# Patient Record
Sex: FEMALE | Race: WHITE | NOT HISPANIC OR LATINO | URBAN - METROPOLITAN AREA
[De-identification: names, ages, dates, MRNs, and addresses within clinical notes are randomized per-mention and may not be internally consistent; named-entity substitution may affect disease eponyms.]

---

## 2024-02-08 ENCOUNTER — CLINICAL SUPPORT (OUTPATIENT)
Dept: AUDIOLOGY | Facility: CLINIC | Age: 64
End: 2024-02-08
Payer: COMMERCIAL

## 2024-02-08 DIAGNOSIS — H90.42 SENSORINEURAL HEARING LOSS (SNHL) OF LEFT EAR WITH UNRESTRICTED HEARING OF RIGHT EAR: Primary | ICD-10-CM

## 2024-02-08 NOTE — PROGRESS NOTES
HEARING NEEDS ASSESSMENT    Name:  Amanda Fiore  :  1960  Age:  63 y.o.    HISTORY:    Patient reported to be a long-time user of CROS style hearing aids as she was diagnosed with hearing loss (left worse than right) in first or second grade, with no known etiology.  Patient provided a copy of her most recent audiogram in office today.  She worked at [employer] around 13 years ago where she purchased Phonak CROS devices.  Since then, she does not work at [employer] anymore and purchase Noreen CROS devices from a different clinic.  She is interested in new CROS devices.    EXAM/PROCEDURES:    Reviewed patient's hearing loss.  Reviewed patient's insurance coverage.     Demonstrated both Oticon CROS technology with patient in office during the appointment.  CROS device in office is battery-operated and hearing aid side is rechargeable.    Discussed patient's communication needs and concerns.  Discussed patient's expectations and goals for use of hearing aids.      Discussed rechargeable considerations, Bluetooth technologies, and features of the hearing aids at different technology levels.  Patient expressed interest in either the basic or advanced technology level.  Reviewed pricing and TLC program.    Provided patient with demo devices to trial at home.  Patient will contact the office or make an appointment when she has made a decision.  She is leaning towards Oticon technology at this time.    RECOMMENDATIONS AND FOLLOW-UP:    Patient is to contact the office when she makes a decision about Oticon CROS device technology level.  She was advised that if she wants to let the office know sooner, the hearing aids will be ordered before she returns for her appointment.      ROSE MARY Patterson./B.S.  Audiology Student Extern    Ronda Wright  Clinical Audiologist

## 2024-03-14 ENCOUNTER — CLINICAL SUPPORT (OUTPATIENT)
Dept: AUDIOLOGY | Facility: CLINIC | Age: 64
End: 2024-03-14
Payer: COMMERCIAL

## 2024-03-14 DIAGNOSIS — H90.42 SENSORINEURAL HEARING LOSS (SNHL) OF LEFT EAR WITH UNRESTRICTED HEARING OF RIGHT EAR: Primary | ICD-10-CM

## 2024-03-14 NOTE — PROGRESS NOTES
HEARING NEEDS ASSESSMENT    Name:  Amanda Fiore  :  1960  Age:  63 y.o.    HISTORY:    Patient was seen for a follow-up hearing needs assessment after she demoed Oticon CROS device.    EXAM/PROCEDURES:    Patient was pleased with the battery life of both the rechargeable device as well as the disposable battery-powered CROS device.  She did well with the hearing aids and is ready to proceed with purchase.  Reviewed price points and technology features of the hearing aids.  Also reviewed the TLC program.    Patient chose Oticon real 3 with CROS in steel gray.  Devices were ordered through my [employer].    RECOMMENDATIONS AND FOLLOW-UP:    Hearing aid fitting scheduled.  Patient to contact the office with any questions or concerns as they arise.  Patient will continue to utilize demo devices until she returns for her scheduled fitting.    Patient is aware that payment is due at the time of fitting.    ROSE MARY Patterson./B.S.  Audiology Student Extern    Ronda Wright  Clinical Audiologist

## 2024-03-19 ENCOUNTER — CLINICAL SUPPORT (OUTPATIENT)
Dept: AUDIOLOGY | Facility: CLINIC | Age: 64
End: 2024-03-19
Payer: COMMERCIAL

## 2024-03-19 DIAGNOSIS — H90.42 SENSORINEURAL HEARING LOSS (SNHL) OF LEFT EAR WITH UNRESTRICTED HEARING OF RIGHT EAR: Primary | ICD-10-CM

## 2024-03-19 NOTE — PROGRESS NOTES
HEARING AID FITTING    Name:  Amanda Fiore  :  1960  Age:  63 y.o.    HEARING AID INFORMATION:    Right Hearing Aid  Oticon Real 3  SN: [id number]  Warranty: 2027  Left Hearing Aid  Oticon CROS PX  SN: [id number]  Warranty: 2027    SN: [id number]   Length   Right: #1, 60 gain  Left: #1, 60 gain  Dome/Earmold  Right: 6 mm open bass  Left: 6 mm open bass  Wax Filter  minifit prowax  Battery Size  rechargeable    TLC Expiration:  2026    EXAM/PROCEDURES:    Patient seen for hearing aid fitting. Changed fitting from CROS fitting to a BiCROS fitting due to patients preferences, as she wanted more volume from the CROS transmitter.    Hearing aids programmed to most recent user audiogram.  Set to adaptation level 2.   Activated automatic adaptation.  Programmed prescription to build to adaptation level 3 over a 3 week period of time. Reviewed alerting tones.  Volume control active.    Reviewed maintenance and care, including wax filter replacement.  Practiced insertion, removal, charging, and VC use.  Patient demonstrated adequate manipulation of the devices.    Completed hearing aid fitting checklist, TLC agreement, and purchase agreement.    Patient chose to not pair hearing aids to her phone at this time. Although she is interested in blue tooth connection for music and audio books, it was discussed that this will be brought up at her follow up appointment.    RECOMMENDATIONS AND FOLLOW-UP:    Recommended and scheduled a follow-up visit before patient leaves for vacation in mid-April. Patient is to contact the office sooner if problems arise.    ROSE MARY Patterson./B.S.  Audiology Student Extern    Ronda Wright  Clinical Audiologist

## 2024-03-21 ENCOUNTER — APPOINTMENT (OUTPATIENT)
Dept: AUDIOLOGY | Facility: CLINIC | Age: 64
End: 2024-03-21
Payer: COMMERCIAL

## 2024-04-11 ENCOUNTER — CLINICAL SUPPORT (OUTPATIENT)
Dept: AUDIOLOGY | Facility: CLINIC | Age: 64
End: 2024-04-11
Payer: COMMERCIAL

## 2024-04-11 DIAGNOSIS — H90.42 SENSORINEURAL HEARING LOSS (SNHL) OF LEFT EAR WITH UNRESTRICTED HEARING OF RIGHT EAR: Primary | ICD-10-CM

## 2024-04-11 NOTE — PROGRESS NOTES
HEARING AID CHECK    Name:  Amanda Fiore  :  1960  Age:  63 y.o.    HEARING AID INFORMATION:    Right Hearing Aid  Oticon Real 3  SN: [id number]  Warranty: 2027  Left Hearing Aid  Oticon CROS PX  SN: [id number]  Warranty: 2027    SN: [id number]   Length   Right: #1, 60 gain  Left: #1, 60 gain  Dome/Earmold  Right: 6 mm open bass  Left: 6 mm open bass  Wax Filter  minifit prowax  Battery Size  rechargeable     TLC Expiration:  2026      HISTORY:    Patient seen for follow up after fitting. Today, she had questions about the volume control on the CROS side as well as wind noise reduction settings. In addition, she stated that starting this week the CROS side (left hearing aid) indicator light is delayed when opening up the . Today, the indicator light did not come on in the morning, but when she put it in it was fully charged. Otherwise, she is satisfied with the sound quality and function of the hearing aids.    EXAM/PROCEDURES:    Verified that wind reduction was turned on and turned sudden sound stabilizer up to high. Discussed and reviewed how to manually change volume on both the CROS side and the actual hearing aid.    Patient stated that the charging abilities do not bother her at this time, but it was advised to monitor the indicator light and if there are problems with charging, especially on the left side to contact the office.    RECOMMENDATIONS AND FOLLOW-UP:    Recommended and scheduled 1-month follow-up HAC.  Patient to contact the office sooner if problems arise.    ROSE MARY Patterson./B.S.  Audiology Student Extern    Ronda Wright  Clinical Audiologist

## 2024-05-16 ENCOUNTER — CLINICAL SUPPORT (OUTPATIENT)
Dept: AUDIOLOGY | Facility: CLINIC | Age: 64
End: 2024-05-16
Payer: COMMERCIAL

## 2024-05-16 DIAGNOSIS — H90.42 SENSORINEURAL HEARING LOSS (SNHL) OF LEFT EAR WITH UNRESTRICTED HEARING OF RIGHT EAR: Primary | ICD-10-CM

## 2024-05-16 NOTE — PROGRESS NOTES
HEARING AID CHECK    Name:  Amanda Fiore  :  1960  Age:  63 y.o.    HEARING AID INFORMATION:    Right Hearing Aid  Oticon Real 3  SN: [id number]  Warranty: 2027  Left Hearing Aid  Oticon CROS PX  SN: [id number]  Warranty: 2027    SN: [id number]   Length   Right: #1, 60 gain  Left: #1, 60 gain  Dome/Earmold  Right: 6 mm open bass  Left: 6 mm open bass  Wax Filter  minifit prowax  Battery Size  rechargeable     TLC Expiration:  2026      HISTORY:    Patient seen for follow-up hearing aid check.  Overall she is doing well.  She still reported occasions where she was not getting enough input from the left side.    EXAM/PROCEDURES:    Reduced more sound intelligence features for noise reduction in order to improve clarity of speech.  Increased CROS input to 4 dB.    Carried on conversation with patient while restaurant noise was playing in the background.  She seemed to do well, but will monitor in her home environment.    RECOMMENDATIONS AND FOLLOW-UP:    Recommended and scheduled 6-month follow-up HAC.  Patient to contact the office sooner if problems arise.      Ronda Wright  Clinical Audiologist

## 2024-11-14 ENCOUNTER — APPOINTMENT (OUTPATIENT)
Dept: AUDIOLOGY | Facility: CLINIC | Age: 64
End: 2024-11-14
Payer: COMMERCIAL

## 2024-11-14 DIAGNOSIS — H90.42 SENSORINEURAL HEARING LOSS (SNHL) OF LEFT EAR WITH UNRESTRICTED HEARING OF RIGHT EAR: Primary | ICD-10-CM

## 2024-11-14 NOTE — PROGRESS NOTES
"HEARING AID CHECK    Name:  Amanda Fiore \"Amanda\"  :  1960  Age:  64 y.o.    HEARING AID INFORMATION:    Right Hearing Aid  Oticon Real 3 miniRITE-R  SN: [id number]  Warranty: 2027  Left Hearing Aid  Oticon CROS PX  SN: [id number]  Warranty: 2027    SN: [id number]   Length   Right: 1(60)  Left: 1(60)  Dome/Earmold  Right: 6 mm open bass  Left: 6 mm open bass  Wax Filter  ProWax miniFit  Battery Size  Rechargeable  TLC Expiration:  2026      HISTORY:    Patient was seen for 6-month check of the above devices.  She reported that she noticed the indicator light on the CROS side sometimes shuts off while charging.  Also, she unplugged her smart  to charge her hearing aids on the go while traveling, and the  was not holding a charge long enough to charge her hearing aids.    Lastly, patient brought in hearing aids that belonged to her uncle who recently passed away.  Incidentally, her uncle also happens to be a patient with this practice.  The hearing aids are relatively new, and happen to be a hearing aid and a CROS.  She inquired if this could be programmed to her hearing loss that she could use as a backup.    EXAM/PROCEDURES:    Cleaned and checked hearing aids.  Vacuumed nell ports and  ports. Completed Redux dehumidification treatment for patient's hearing aids where <0.5 uL of moisture was removed during a 2:53 minute cycle. Replaced wax filters and domes. Final listening check indicated adequate sound quality and function with no distortion of internal feedback.  Completed firmware update.    Visual inspection of the CROS revealed intermittent function of the indicator light.  Counseled patient that this may just be a malfunction with the light, and the only way to resolve the issue would be to send the place in for repair.  As for her smart , it should be holding a charge when unplug from a power source, but Oticon will be contacted " to determine if this is a known issue.    In regards to the other pair of hearing aids that she acquired from her uncle, informed patient that they would be able to be programmed for her as a backup.  It was recommended that she schedule an hour-long hearing aid check so that they can be cleaned, checked, programmed, and verified.    RECOMMENDATIONS AND FOLLOW-UP:    - Continue use of amplification and follow-up as recommended for hearing aid maintenance and adjustments.  - Schedule 60 minute HAC to have back-up hearing aids programmed.  - Recommended 6-month follow-up HAC. Patient to contact the office sooner if problems arise.  - Monitor and recheck hearing as warranted.  - Counseled regarding results and recommendations.      Ronda Maher  Clinical Audiologist

## 2025-01-22 NOTE — PROGRESS NOTES
"HEARING AID CHECK    Name:  Amanda Fiore \"Amanda\"  :  1960  Age:  64 y.o.    HEARING AID INFORMATION:    Right Hearing Aid  Oticon Real 3 miniRITE-R  SN: [id number]  Warranty: 2027  Left Hearing Aid  Oticon CROS PX  SN: [id number]  Warranty: 2027    SN: [id number]   Length   Right: 1(60)  Left: 1(60)  Dome/Earmold  Right: 6 mm open bass  Left: 6 mm open bass  Wax Filter  ProWax miniFit  Battery Size  Rechargeable  TLC Expiration:  2026    Back-Up Hearing Aids:    Right Hearing Aid  Phonak Audeo P90 13T  SN: [id number]  Warranty: 2026  Left Hearing Aid  Phonak CROS P13 T  SN: [id number]  Warranty: 2024  TV Adapter  SN: [id number]  Remote Control  SN: [id number] (not paired)   Length  Right: 1M  Left: 1C  Dome/Earmold  Right: Small open  Left: Small open  Wax Filter  CeruStop  Battery Size  13/orange  TLC Expiration:  10/3/2026      HISTORY:    Patient was seen to have back-up hearing aids cleaned, checked, and programmed.  Recall, patient received these hearing aids from her uncle who recently passed away.  He also happened to be a patient at this practice.  Hearing aids are still under warranty with active TLC.  As a courtesy, the TLC will be applied since Amanda is a longtime patient.    EXAM/PROCEDURES:    Visual inspection revealed minimal cerumen accumulation. Cleaned and checked hearing aids.  Vacuumed nell ports and  ports. Cleaned battery contacts. Completed Redux dehumidification treatment for patient's hearing aids where <0.5 uL of moisture was removed during a 3:44 minute cycle. Replaced wax filters and domes. Final listening check indicated adequate sound quality and function with no distortion of internal feedback.     Connected hearing aids to fitting software. Confirmed no firmware update was needed.  Programmed hearing aid to patient's most recent audiogram.  Patient reported sound to be a bit loud, adjusted CROS " balance 1 step to the left.    Successfully paired the hearing aid to the TV connector.  Counseled patient that hearing aid will automatically connect to the TV when the hearing aid and TV are on and she is in the vicinity. The remote control was not paired at this time, as patient has volume control on the hearing aid, and does not have any other programs besides the TV connector which is automatic.    RECOMMENDATIONS AND FOLLOW-UP:    - Continue use of amplification and follow-up as recommended for hearing aid maintenance and adjustments.  - Recommended 6-month follow-up HAC. Patient to contact the office sooner if problems arise.  - Monitor and recheck hearing as warranted.  - Counseled regarding results and recommendations.      ROSE MARY Newberry., B.S.  Audiology Student Extern    Ronda Maher  Clinical Audiologist

## 2025-01-23 ENCOUNTER — CLINICAL SUPPORT (OUTPATIENT)
Dept: AUDIOLOGY | Facility: CLINIC | Age: 65
End: 2025-01-23
Payer: COMMERCIAL

## 2025-01-23 ENCOUNTER — APPOINTMENT (OUTPATIENT)
Dept: AUDIOLOGY | Facility: CLINIC | Age: 65
End: 2025-01-23
Payer: COMMERCIAL

## 2025-01-23 DIAGNOSIS — H90.42 SENSORINEURAL HEARING LOSS (SNHL) OF LEFT EAR WITH UNRESTRICTED HEARING OF RIGHT EAR: Primary | ICD-10-CM

## 2025-05-22 ENCOUNTER — APPOINTMENT (OUTPATIENT)
Dept: AUDIOLOGY | Facility: CLINIC | Age: 65
End: 2025-05-22
Payer: COMMERCIAL

## 2025-06-14 ENCOUNTER — APPOINTMENT (OUTPATIENT)
Dept: AUDIOLOGY | Facility: CLINIC | Age: 65
End: 2025-06-14
Payer: COMMERCIAL

## 2025-06-14 DIAGNOSIS — H90.42 SENSORINEURAL HEARING LOSS (SNHL) OF LEFT EAR WITH UNRESTRICTED HEARING OF RIGHT EAR: Primary | ICD-10-CM

## 2025-06-14 NOTE — PROGRESS NOTES
"HEARING AID CHECK    Name:  Amanda Fiore \"Amanda\"  :  1960  Age:  64 y.o.    HEARING AID INFORMATION:    Right Hearing Aid  Oticon Real 3 miniRITE-R  SN: [id number]  Warranty: 2027  Left Hearing Aid  Oticon CROS PX  SN: [id number]  Warranty: 2027    SN: [id number]   Length   Right: 2(60)  Left: 2(60)  Dome/Earmold  Right: 6 mm open bass  Left: 6 mm open bass  Wax Filter  ProWax miniFit  Battery Size  Rechargeable  TLC Expiration:  2026     Back-Up Hearing Aids:     Right Hearing Aid  Phonak Audeo P90 13T  SN: [id number]  Warranty: 2026  Left Hearing Aid  Phonak CROS P13 T  SN: [id number]  Warranty: 2024  TV Adapter  SN: [id number]  Remote Control  SN: [id number] (not paired)   Length  Right: 1M  Left: 1C  Dome/Earmold  Right: Small open  Left: Small open  Wax Filter  CeruStop  Battery Size  13/orange  TLC Expiration:  10/3/2026      HISTORY:    Patient was seen for 6-month check of the above devices.  Patient requested trying another length longer on the  wires, as she still getting discomfort on the top of her pinna with the use of glasses.    EXAM/PROCEDURES:    Visual inspection revealed minimal cerumen accumulation on the dome with clear wax guards. Cleaned and checked hearing aids.  Vacuumed nell ports and  ports. Completed Redux dehumidification treatment for patient's hearing aids where 0.7 uL of moisture was removed during a 5:48 minute cycle. Replaced wax filters and domes.  Exchanged 1(60) receivers for 2(60) receivers, bilaterally.  Final listening check indicated adequate sound quality and function with no distortion of internal feedback.  Completed firmware update.    Also completed a brief clean and check on patient's backup Phonak hearing aids, as patient reported she does not wear them very often.  Vacuumed nell ports and  ports.  Checked domes and wax guards, which were clear.    RECOMMENDATIONS " AND FOLLOW-UP:    - Continue use of amplification and follow-up as recommended for hearing aid maintenance and adjustments.  - Recommended 6-month follow-up HAC. Patient to contact the office sooner if problems arise.  - Monitor and recheck hearing as warranted.  - Counseled regarding results and recommendations.      Ronda Maher  Clinical Audiologist

## 2025-12-09 ENCOUNTER — APPOINTMENT (OUTPATIENT)
Dept: AUDIOLOGY | Facility: CLINIC | Age: 65
End: 2025-12-09
Payer: COMMERCIAL